# Patient Record
Sex: FEMALE | NOT HISPANIC OR LATINO | Employment: OTHER | URBAN - METROPOLITAN AREA
[De-identification: names, ages, dates, MRNs, and addresses within clinical notes are randomized per-mention and may not be internally consistent; named-entity substitution may affect disease eponyms.]

---

## 2020-06-09 PROCEDURE — 88360 TUMOR IMMUNOHISTOCHEM/MANUAL: CPT | Performed by: RADIOLOGY

## 2020-06-09 PROCEDURE — 88305 TISSUE EXAM BY PATHOLOGIST: CPT | Performed by: RADIOLOGY

## 2020-06-10 ENCOUNTER — LAB REQUISITION (OUTPATIENT)
Dept: LAB | Facility: HOSPITAL | Age: 80
End: 2020-06-10

## 2020-06-10 DIAGNOSIS — Z00.00 ENCOUNTER FOR GENERAL ADULT MEDICAL EXAMINATION WITHOUT ABNORMAL FINDINGS: ICD-10-CM

## 2020-06-18 LAB
LAB AP CASE REPORT: NORMAL
LAB AP DIAGNOSIS COMMENT: NORMAL
LAB AP IHC ER/PR REPORT,ADDENDUM: NORMAL
LAB AP IHC HER2/NEU REPORT,ADDENDUM: NORMAL
PATH REPORT.FINAL DX SPEC: NORMAL
PATH REPORT.GROSS SPEC: NORMAL

## 2021-05-06 ENCOUNTER — TRANSCRIBE ORDERS (OUTPATIENT)
Dept: PHYSICAL THERAPY | Facility: CLINIC | Age: 81
End: 2021-05-06

## 2021-05-06 DIAGNOSIS — M16.0 PRIMARY OSTEOARTHRITIS OF BOTH HIPS: ICD-10-CM

## 2021-05-06 DIAGNOSIS — M70.62 TROCHANTERIC BURSITIS OF LEFT HIP: Primary | ICD-10-CM

## 2021-05-17 ENCOUNTER — TREATMENT (OUTPATIENT)
Dept: PHYSICAL THERAPY | Facility: CLINIC | Age: 81
End: 2021-05-17

## 2021-05-17 DIAGNOSIS — R29.898 WEAKNESS OF RIGHT LEG: ICD-10-CM

## 2021-05-17 DIAGNOSIS — M25.559 PAIN, HIP: ICD-10-CM

## 2021-05-17 DIAGNOSIS — M70.62 TROCHANTERIC BURSITIS OF LEFT HIP: Primary | ICD-10-CM

## 2021-05-17 PROCEDURE — 97110 THERAPEUTIC EXERCISES: CPT | Performed by: PHYSICAL THERAPIST

## 2021-05-17 PROCEDURE — 97161 PT EVAL LOW COMPLEX 20 MIN: CPT | Performed by: PHYSICAL THERAPIST

## 2021-05-17 PROCEDURE — G0283 ELEC STIM OTHER THAN WOUND: HCPCS | Performed by: PHYSICAL THERAPIST

## 2021-05-17 PROCEDURE — 97140 MANUAL THERAPY 1/> REGIONS: CPT | Performed by: PHYSICAL THERAPIST

## 2021-05-17 NOTE — PROGRESS NOTES
Physical Therapy Initial Evaluation and Plan of Care    Patient: Kathy Sidhu   : 1940  Diagnosis/ICD-10 Code:  Trochanteric bursitis of left hip [M70.62]  Referring practitioner: Wilberto Bradford, *  Date of Initial Visit: 2021  Today's Date: 2021  Patient seen for 1 sessions           Subjective Questionnaire: Oxford Hip score       Subjective Evaluation    History of Present Illness  Mechanism of injury: Patient reports that she has had L hip pain since [place] time.  She was seen by ortho - received and injection- pain free until 6 weeks ago.  The pain resurfaced; she did go back to ortho and received a 2nd injection approx 2 - 3 weeks ago.  Only minimal relief at that time. Has noticed that she does shuffle her feet and L lateral  Notices that she must think about stepping up with the R vs L due to the feeling of weakness. She is also having to pull on the handrails.  Has 2-step entry.  LOCATION  Lateral L hip.  DESCRIPTION  Dull ache, feels weak   INCREASES  In/out of car, up steps, sit to stand  DECREASES  Wait a bit before moving.    1ST AM:  Tender and sometimes unpredicible  TIME OF DAY: not consistent with time of day   SLEEP normal is BSL.  Has difficulty getting to sleep   Min change with movement.    TREATMENT:  Ice; you not a diligent  PAST MEDICAL HISTORY:   Breast cancer with chem radiation last year.       Quality of life: good    Pain  Current pain ratin  At best pain ratin  At worst pain ratin    Social Support  Lives with: alone    Treatments  Previous treatment: injection treatment           Objective          Palpation   Left   Tenderness of the gluteus medius, iliopsoas and TFL.     Tenderness     Right Hip   Tenderness in the greater trochanter.     Active Range of Motion   Left Hip   Flexion: 101 degrees   Extension: 5 degrees   Abduction: 25 degrees   External rotation (90/90): 20 degrees     Right Hip   Flexion: with pain  Abduction: with  pain    Joint Play     Right Hip     Hypomobile in the posterior hip capsule, anterior hip capsule, lateral hip capsule and long axis distraction    Strength/Myotome Testing     Left Hip   Planes of Motion   Flexion: 3+  Extension: 4-  Abduction: 3+  Adduction: 3+  External rotation: 3+  Internal rotation: 3+    Right Hip   Planes of Motion   Flexion: 3+    Tests     Left Hip   Positive scour.     Left Hip Flexibility Comments:   Severe limitations:  Hamstrings, piriformis, and hip ER  Mod restrictions of gastroc, psoas, ITB    Ambulation     Observational Gait   Decreased walking speed, right stance time, right swing time and right step length.     Additional Observational Gait Details  Reduced Left extension and mild left lateral lean          Assessment & Plan     Assessment  Impairments: abnormal gait, abnormal or restricted ROM, activity intolerance, impaired balance, lacks appropriate home exercise program and pain with function  Assessment details: Ms. Sidhu is a 80 yr old female referred to PT due to L hip pain.  She was seen in the clinic today for PT evaluation and treatment.  The evidence is consistent with L hip bursitis with associated weakness and reduced ROM.  She would benefit from OPPT in order to achieve the stated goals and resume prior activities.     Eval complexity:  Moderate due to # areas assess, PMHX, decision making and evolving.   Prognosis: good  Functional Limitations: walking, uncomfortable because of pain and sitting  Plan  Therapy options: will be seen for skilled physical therapy services  Planned modality interventions: cryotherapy, dry needling, high voltage pulsed current (pain management), thermotherapy (hydrocollator packs) and ultrasound  Planned therapy interventions: manual therapy, neuromuscular re-education, soft tissue mobilization, strengthening, stretching, therapeutic activities, joint mobilization, home exercise program, gait training, functional ROM exercises,  flexibility, body mechanics training and balance/weight-bearing training  Frequency: 2x week  Duration in visits: 20  Treatment plan discussed with: patient  Plan details: Patient goals:  No pain   STGs:  3 weeks     1)  Pain levels 0-4/10     2)  Pain free and WNLs L hip motion     3)  Patient to report 25% reduction in pain during sit <> stand transitions     4)  Patient to demonstrate correct HEP for hip motion and flexibility ex    LTG:   DC     1)  No hip pain     2)  Normal gait on level and unlevel surfaces     3)  MMT =/> 4/5 L hip     4)  Improve [place] hip =/> 10 points     5)  IHEP        Timed:         Manual Therapy:    9    mins  57333;     Therapeutic Exercise:    11     mins  00657;     Neuromuscular Dodie:        mins  77510;    Therapeutic Activity:          mins  16641;     Gait Training:           mins  01569;     Ultrasound:          mins  60959;    Ionto                                   mins   67258  Self Care                       5     mins   03729  Canalith Repos         mins 86336      Un-Timed:  Electrical Stimulation:    15     mins  38281 (MC );  Dry Needling          mins self-pay  Traction          mins 18492  Low Eval     24     Mins  62174  Mod Eval          Mins  03069  High Eval                            Mins  35548  Re-Eval                               mins  44366        Timed Treatment:   24   mins   Total Treatment:     63   mins    PT SIGNATURE: Martina Ladd PT   DATE TREATMENT INITIATED: 5/17/2021    Initial Certification  Certification Period: 8/15/2021  I certify that the therapy services are furnished while this patient is under my care.  The services outlined above are required by this patient, and will be reviewed every 90 days.     PHYSICIAN: Wilberto Bradford MD      DATE:     Please sign and return via fax to [phone].. Thank you, [facility].

## 2021-05-21 ENCOUNTER — TREATMENT (OUTPATIENT)
Dept: PHYSICAL THERAPY | Facility: CLINIC | Age: 81
End: 2021-05-21

## 2021-05-21 DIAGNOSIS — M70.62 TROCHANTERIC BURSITIS OF LEFT HIP: Primary | ICD-10-CM

## 2021-05-21 DIAGNOSIS — M25.559 PAIN, HIP: ICD-10-CM

## 2021-05-21 DIAGNOSIS — R29.898 WEAKNESS OF RIGHT LEG: ICD-10-CM

## 2021-05-21 PROCEDURE — 97140 MANUAL THERAPY 1/> REGIONS: CPT | Performed by: PHYSICAL THERAPIST

## 2021-05-21 PROCEDURE — 97110 THERAPEUTIC EXERCISES: CPT | Performed by: PHYSICAL THERAPIST

## 2021-05-21 NOTE — PROGRESS NOTES
Physical Therapy Daily Progress Note    VISIT#: 2    Subjective   Kathy Sidhu reports she drove down to [place] yesterday to visit some friends. She had some cream that helps with inflammation and she put that on her hip and she tolerated the drive without any discomfort. States the only exercise she is having trouble with is the seated exercise where she has to cross her leg.   Current Pain Level: 1-2/10    Objective   TTP over the L greater trochanter    See Exercise, Manual, and Modality Logs for complete treatment.     Patient Education: changed hip ER stretch from seated to HL. Added hip IR stretch and hip flexor stretch to her HEP. Handouts given.    Assessment/Plan  Patient is seeing some improvement with her hip symptoms since starting treatment. Her L hip ROM is still limited however she was able to tolerate the HL hip ER stretch better than in the seated position.    Progress per Plan of Care    Goals  STGs:  3 weeks     1)  Pain levels 0-4/10     2)  Pain free and WNLs L hip motion     3)  Patient to report 25% reduction in pain during sit <> stand transitions     4)  Patient to demonstrate correct HEP for hip motion and flexibility ex    LTG:   DC     1)  No hip pain     2)  Normal gait on level and unlevel surfaces     3)  MMT =/> 4/5 L hip     4)  Improve [place] hip =/> 10 points     5)  IHEP          Timed:         Manual Therapy:    13     mins  96631;     Therapeutic Exercise:    35     mins  42819;       Un-Timed:  Electrical Stimulation:    15     mins  68739 ( );      Timed Treatment:   48   mins   Total Treatment:     63   mins      Carina Barger PTA    Physical Therapist Assistant

## 2021-05-24 ENCOUNTER — TREATMENT (OUTPATIENT)
Dept: PHYSICAL THERAPY | Facility: CLINIC | Age: 81
End: 2021-05-24

## 2021-05-24 DIAGNOSIS — M70.62 TROCHANTERIC BURSITIS OF LEFT HIP: Primary | ICD-10-CM

## 2021-05-24 DIAGNOSIS — R29.898 WEAKNESS OF RIGHT LEG: ICD-10-CM

## 2021-05-24 DIAGNOSIS — M25.559 PAIN, HIP: ICD-10-CM

## 2021-05-24 PROCEDURE — 97140 MANUAL THERAPY 1/> REGIONS: CPT | Performed by: PHYSICAL THERAPIST

## 2021-05-24 PROCEDURE — G0283 ELEC STIM OTHER THAN WOUND: HCPCS | Performed by: PHYSICAL THERAPIST

## 2021-05-24 PROCEDURE — 97110 THERAPEUTIC EXERCISES: CPT | Performed by: PHYSICAL THERAPIST

## 2021-05-24 NOTE — PROGRESS NOTES
Physical Therapy Daily Progress Note    VISIT#: 3    Subjective   Kathy Sidhu reports her hip feels a little better today but it is still bothering her at times.   Current Pain Level: 3/10    Objective     See Exercise, Manual, and Modality Logs for complete treatment.     Patient Education: cueing for form; added SLR, hip abd and clamshells to her HEP. Printouts given.    Assessment/Plan  Patient tolerated progressed LE strengthening exercises well this date. She remains tender to palpation over the L greater trochanter. Better tolerance to her stretches but the fig 4 stretch can still be challenging to get into position. Once in the correct position the stretches are fine.    Progress per Plan of Care     Goals  STGs:  3 weeks     1)  Pain levels 0-4/10     2)  Pain free and WNLs L hip motion     3)  Patient to report 25% reduction in pain during sit <> stand transitions     4)  Patient to demonstrate correct HEP for hip motion and flexibility ex    LTG:   DC     1)  No hip pain     2)  Normal gait on level and unlevel surfaces     3)  MMT =/> 4/5 L hip     4)  Improve [place] hip =/> 10 points     5)  IHEP           Timed:         Manual Therapy:    18     mins  42450;     Therapeutic Exercise:    33     mins  93153;         Un-Timed:  Electrical Stimulation:    15     mins  99191 ( );  NuStep: 7 min (no charge)    Timed Treatment:   51   mins   Total Treatment:     73   mins      Carina Barger PTA    Physical Therapist Assistant

## 2021-05-28 ENCOUNTER — TREATMENT (OUTPATIENT)
Dept: PHYSICAL THERAPY | Facility: CLINIC | Age: 81
End: 2021-05-28

## 2021-05-28 DIAGNOSIS — M70.62 TROCHANTERIC BURSITIS OF LEFT HIP: Primary | ICD-10-CM

## 2021-05-28 DIAGNOSIS — R29.898 WEAKNESS OF RIGHT LEG: ICD-10-CM

## 2021-05-28 DIAGNOSIS — M25.559 PAIN, HIP: ICD-10-CM

## 2021-05-28 PROCEDURE — 97110 THERAPEUTIC EXERCISES: CPT | Performed by: PHYSICAL THERAPIST

## 2021-05-28 PROCEDURE — G0283 ELEC STIM OTHER THAN WOUND: HCPCS | Performed by: PHYSICAL THERAPIST

## 2021-05-28 PROCEDURE — 97530 THERAPEUTIC ACTIVITIES: CPT | Performed by: PHYSICAL THERAPIST

## 2021-05-28 NOTE — PROGRESS NOTES
Physical Therapy Daily Progress Note    VISIT#: 4    Subjective   Kathy Sidhu reports her hip has felt a lot better since her last visit. She isn't sure if it was the treatment or the icyhot patch she put on last night but it is better.  Current Pain Level: 0/10    Objective     See Exercise, Manual, and Modality Logs for complete treatment.     Patient Education: added standing hip flex, abd, ext and seated hip ER to her HEP. Handouts given.    Assessment/Plan  Patient is seeing an improvement in her hip symptoms. Hip ER ROM is still limited but showing slow improvement. Remains very tender to palpation with the XFM but that treatment does seem to help. Pt has been consistent with her HEP.     Progress per Plan of Care     Goals  STGs:  3 weeks     1)  Pain levels 0-4/10     2)  Pain free and WNLs L hip motion     3)  Patient to report 25% reduction in pain during sit <> stand transitions     4)  Patient to demonstrate correct HEP for hip motion and flexibility ex    LTG:   DC     1)  No hip pain     2)  Normal gait on level and unlevel surfaces     3)  MMT =/> 4/5 L hip     4)  Improve [place] hip =/> 10 points     5)  IHEP          Timed:         Manual Therapy:    3     mins  77035;     Therapeutic Exercise:    30     mins  59776;      Therapeutic Activity:     12     mins  68129;         Un-Timed:  Bike: 10 min (no charge)  Electrical Stimulation: _15__ mins 00559 ( );      Timed Treatment:   45   mins   Total Treatment:     70   mins      Carina Barger PTA    Physical Therapist Assistant

## 2021-06-01 ENCOUNTER — TREATMENT (OUTPATIENT)
Dept: PHYSICAL THERAPY | Facility: CLINIC | Age: 81
End: 2021-06-01

## 2021-06-01 DIAGNOSIS — M70.62 TROCHANTERIC BURSITIS OF LEFT HIP: Primary | ICD-10-CM

## 2021-06-01 DIAGNOSIS — R29.898 WEAKNESS OF RIGHT LEG: ICD-10-CM

## 2021-06-01 DIAGNOSIS — M25.559 PAIN, HIP: ICD-10-CM

## 2021-06-01 PROCEDURE — 97110 THERAPEUTIC EXERCISES: CPT | Performed by: PHYSICAL THERAPIST

## 2021-06-01 PROCEDURE — G0283 ELEC STIM OTHER THAN WOUND: HCPCS | Performed by: PHYSICAL THERAPIST

## 2021-06-01 PROCEDURE — 97140 MANUAL THERAPY 1/> REGIONS: CPT | Performed by: PHYSICAL THERAPIST

## 2021-06-01 NOTE — PROGRESS NOTES
Physical Therapy Daily Progress Note    VISIT#: 5    Subjective   Kathy Sidhu reports her L hip started really bothering her on Sunday but wasn't doing anything extra or out of the ordinary.  She is also noticing her L Achilles tendon has been very sore and isn't sure why.  Current Pain Level: 5/10    Objective     See Exercise, Manual, and Modality Logs for complete treatment.     Patient Education: she was using a thera band for her stretches at home and believed to be over stretching her ankle due to pulling extra hard with the stretchy band. Recommended using a non-stretchy strap for her stretch.    Assessment/Plan  Kathy presented with increased pain and discomfort in the L hip today with stretches and exercises. Limited tolerance to treatment Did not recreate her heel pain while in the clinic this date and tolerated it better with the stretch out strap. Held standing activities due to the increased pain. ESTIM/ice used at end of session for pain management.    Progress per Plan of Care     Goals  STGs:  3 weeks     1)  Pain levels 0-4/10     2)  Pain free and WNLs L hip motion     3)  Patient to report 25% reduction in pain during sit <> stand transitions     4)  Patient to demonstrate correct HEP for hip motion and flexibility ex    LTG:   DC     1)  No hip pain     2)  Normal gait on level and unlevel surfaces     3)  MMT =/> 4/5 L hip     4)  Improve [place] hip =/> 10 points     5)  IHEP          Timed:         Manual Therapy:    17     mins  99321;     Therapeutic Exercise:    38     mins  70770;        Un-Timed:  Electrical Stimulation:    15     mins  77757 ( );      Timed Treatment:   55   mins   Total Treatment:     70   mins      Carina Barger PTA    Physical Therapist Assistant

## 2021-06-03 ENCOUNTER — TREATMENT (OUTPATIENT)
Dept: PHYSICAL THERAPY | Facility: CLINIC | Age: 81
End: 2021-06-03

## 2021-06-03 DIAGNOSIS — M70.62 TROCHANTERIC BURSITIS OF LEFT HIP: Primary | ICD-10-CM

## 2021-06-03 DIAGNOSIS — M25.559 PAIN, HIP: ICD-10-CM

## 2021-06-03 PROCEDURE — 97110 THERAPEUTIC EXERCISES: CPT | Performed by: PHYSICAL THERAPIST

## 2021-06-03 PROCEDURE — G0283 ELEC STIM OTHER THAN WOUND: HCPCS | Performed by: PHYSICAL THERAPIST

## 2021-06-03 PROCEDURE — 97140 MANUAL THERAPY 1/> REGIONS: CPT | Performed by: PHYSICAL THERAPIST

## 2021-06-03 NOTE — PROGRESS NOTES
Physical Therapy Daily Progress Note    VISIT#: 6    Subjective   Kathy Sidhu reports that she is doing well with decreased pain in her hip at this time.   Pain Rating (0/10): 3    Objective     See Exercise, Manual, and Modality Logs for complete treatment.     Assessment/Plan   Pt continues to progress LLE strength and standing endurance with minimal increase in pain. Pt continues to have decreased mobility in L hip at this time.     Plan  Progress per Plan of Care and Progress strengthening /stabilization /functional activity            Timed:         Manual Therapy:    15     mins  78126;     Therapeutic Exercise:    15     mins  38758;     Neuromuscular Dodie:        mins  75318;    Therapeutic Activity:          mins  16937;     Gait Training:           mins  64360;     Ultrasound:         mins  26719;    Ionto                                   mins   95186  Self Care                            mins   90129    Un-Timed:  Electrical Stimulation:    15     mins  34678 ( );  Dry Needling          mins self-pay  Traction          mins 36588  Low Eval          Mins  43870  Mod Eval          Mins  07053  High Eval                            Mins  87771  Re-Eval                               mins  56255    Timed Treatment:   30   mins   Total Treatment:     45   mins    Jane Sharma PT, DPT  Physical Therapist

## 2021-06-08 ENCOUNTER — TREATMENT (OUTPATIENT)
Dept: PHYSICAL THERAPY | Facility: CLINIC | Age: 81
End: 2021-06-08

## 2021-06-08 DIAGNOSIS — M25.559 PAIN, HIP: ICD-10-CM

## 2021-06-08 DIAGNOSIS — R29.898 WEAKNESS OF RIGHT LEG: ICD-10-CM

## 2021-06-08 DIAGNOSIS — M70.62 TROCHANTERIC BURSITIS OF LEFT HIP: Primary | ICD-10-CM

## 2021-06-08 PROCEDURE — 97140 MANUAL THERAPY 1/> REGIONS: CPT | Performed by: PHYSICAL THERAPIST

## 2021-06-08 PROCEDURE — 97110 THERAPEUTIC EXERCISES: CPT | Performed by: PHYSICAL THERAPIST

## 2021-06-08 PROCEDURE — 97530 THERAPEUTIC ACTIVITIES: CPT | Performed by: PHYSICAL THERAPIST

## 2021-06-08 NOTE — PROGRESS NOTES
Physical Therapy Daily Progress Note    VISIT#: 7    Subjective   Kathy Sidhu reports her hip has done very well over the weekend and through today. She has been consistent with her HEP and seems to really be helping.  Current Pain Level: 0/10     Objective     See Exercise, Manual, and Modality Logs for complete treatment.     Patient Education: added some TB to her standing 4 way hip and added small cones to her side steps.    Assessment/Plan  Patient is seeing an improvement with her pain level overall. The only thing still limiting her is the weakness still present in the LLE. Her balance is good and did not require UE support for the cone step overs. Added some TB resistance to standing 4 way hip and cones to her side steps and performed well with only mild fatigue following.     Progress per Plan of Care     Goals  STGs:  3 weeks     1)  Pain levels 0-4/10     2)  Pain free and WNLs L hip motion     3)  Patient to report 25% reduction in pain during sit <> stand transitions     4)  Patient to demonstrate correct HEP for hip motion and flexibility ex    LTG:   DC     1)  No hip pain     2)  Normal gait on level and unlevel surfaces     3)  MMT =/> 4/5 L hip     4)  Improve [place] hip =/> 10 points     5)  IHEP          Timed:         Manual Therapy:    10     mins  43472;     Therapeutic Exercise:    15     mins  99940;      Therapeutic Activity:     13     mins  67192;         Un-Timed:  NuStep: 7 min (no charge)      Timed Treatment:   38   mins   Total Treatment:     45   mins      Carina Barger PTA    Physical Therapist Assistant

## 2021-06-10 ENCOUNTER — TREATMENT (OUTPATIENT)
Dept: PHYSICAL THERAPY | Facility: CLINIC | Age: 81
End: 2021-06-10

## 2021-06-10 DIAGNOSIS — M25.559 PAIN, HIP: ICD-10-CM

## 2021-06-10 DIAGNOSIS — R29.898 WEAKNESS OF RIGHT LEG: ICD-10-CM

## 2021-06-10 DIAGNOSIS — M70.62 TROCHANTERIC BURSITIS OF LEFT HIP: Primary | ICD-10-CM

## 2021-06-10 PROCEDURE — 97530 THERAPEUTIC ACTIVITIES: CPT | Performed by: PHYSICAL THERAPIST

## 2021-06-10 PROCEDURE — 97110 THERAPEUTIC EXERCISES: CPT | Performed by: PHYSICAL THERAPIST

## 2021-06-10 PROCEDURE — 97140 MANUAL THERAPY 1/> REGIONS: CPT | Performed by: PHYSICAL THERAPIST

## 2021-06-10 NOTE — PROGRESS NOTES
Physical Therapy Daily Progress Note    VISIT#: 8    Subjective   Kathy Sidhu reports she was a little sore the following day from the new TB exercises however today she is back to not having any pain in the hip just some stiffness.  Current Pain Level: 0/10     Objective     See Exercise, Manual, and Modality Logs for complete treatment.     Patient Education: switched HS stretch to seated vs supine due to groin discomfort. Added some mini squats to her HEP.    Assessment/Plan  Patient did get sore following the progression of exercises last visit however she recovered quickly and her tolerance to activity is improved. She still presents with some stiffness and limited hip ROM but continues to work on her flexibility in the clinic and at home.     Progress per Plan of Care     Goals  STGs:  3 weeks     1)  Pain levels 0-4/10     2)  Pain free and WNLs L hip motion     3)  Patient to report 25% reduction in pain during sit <> stand transitions     4)  Patient to demonstrate correct HEP for hip motion and flexibility ex    LTG:   DC     1)  No hip pain     2)  Normal gait on level and unlevel surfaces     3)  MMT =/> 4/5 L hip     4)  Improve [place] hip =/> 10 points     5)  IHEP             Timed:         Manual Therapy:    14     mins  23010;     Therapeutic Exercise:    25     mins  07288;      Therapeutic Activity:     16     mins  57953;       Un-Timed:    Timed Treatment:   55   mins   Total Treatment:     55   mins      Carina Barger PTA    Physical Therapist Assistant

## 2021-06-15 ENCOUNTER — TREATMENT (OUTPATIENT)
Dept: PHYSICAL THERAPY | Facility: CLINIC | Age: 81
End: 2021-06-15

## 2021-06-15 DIAGNOSIS — R29.898 WEAKNESS OF RIGHT LEG: ICD-10-CM

## 2021-06-15 DIAGNOSIS — M70.62 TROCHANTERIC BURSITIS OF LEFT HIP: Primary | ICD-10-CM

## 2021-06-15 DIAGNOSIS — M25.559 PAIN, HIP: ICD-10-CM

## 2021-06-15 PROCEDURE — 97530 THERAPEUTIC ACTIVITIES: CPT | Performed by: PHYSICAL THERAPIST

## 2021-06-15 PROCEDURE — 97110 THERAPEUTIC EXERCISES: CPT | Performed by: PHYSICAL THERAPIST

## 2021-06-15 PROCEDURE — 97140 MANUAL THERAPY 1/> REGIONS: CPT | Performed by: PHYSICAL THERAPIST

## 2021-06-15 NOTE — PROGRESS NOTES
Physical Therapy Daily Progress Note    VISIT#: 9    Subjective   Kathy Sidhu reports no soreness following her last session.   Current Pain Level: 1/10    Objective   Decreased tenderness to plapation over the L greater trochanter.    See Exercise, Manual, and Modality Logs for complete treatment.     Assessment/Plan  Patient's pain and strength in the L hip/LE is improving. Her ROM in the hip is still tight and restricted but is making gradual progress. She is consistent with her HEP.    Next Visit: add weight to SLR's  Progress per Plan of Care     Goals  STGs:  3 weeks     1)  Pain levels 0-4/10     2)  Pain free and WNLs L hip motion     3)  Patient to report 25% reduction in pain during sit <> stand transitions     4)  Patient to demonstrate correct HEP for hip motion and flexibility ex    LTG:   DC     1)  No hip pain     2)  Normal gait on level and unlevel surfaces     3)  MMT =/> 4/5 L hip     4)  Improve [place] hip =/> 10 points     5)  IHEP          Timed:         Manual Therapy:    14     mins  48407;     Therapeutic Exercise:    17     mins  18650;      Therapeutic Activity:     13     mins  82927;       Un-Timed:  NuStep: 7 min (no charge)      Timed Treatment:   44   mins   Total Treatment:     51   mins      Carina Barger PTA    Physical Therapist Assistant

## 2021-06-24 ENCOUNTER — TREATMENT (OUTPATIENT)
Dept: PHYSICAL THERAPY | Facility: CLINIC | Age: 81
End: 2021-06-24

## 2021-06-24 DIAGNOSIS — M70.62 TROCHANTERIC BURSITIS OF LEFT HIP: Primary | ICD-10-CM

## 2021-06-24 DIAGNOSIS — R29.898 WEAKNESS OF RIGHT LEG: ICD-10-CM

## 2021-06-24 DIAGNOSIS — M25.559 PAIN, HIP: ICD-10-CM

## 2021-06-24 PROCEDURE — 97140 MANUAL THERAPY 1/> REGIONS: CPT | Performed by: PHYSICAL THERAPIST

## 2021-06-24 PROCEDURE — 97530 THERAPEUTIC ACTIVITIES: CPT | Performed by: PHYSICAL THERAPIST

## 2021-06-24 PROCEDURE — G0283 ELEC STIM OTHER THAN WOUND: HCPCS | Performed by: PHYSICAL THERAPIST

## 2021-06-24 NOTE — PROGRESS NOTES
"Physical Therapy 30-Day  Progress Note    VISIT#: 10    Subjective   Kathy Sidhu reports that she had her port removed this past week; some soreness in the chest from the procedure.  L hip pain is \"doing better\"   0-3/10.  Most of the time the pain is 2/10       Objective   ROM:   L hip flex 112,  abd 40,  Er 29  MMT:  Flex 3+, abd 3+, ER 3+ all with mild pain  Gait:  wo AD,  Initial steps from sitting with moderate L ipsilateral trunk lean, mild reduced LLE stance time, early heel off.  Sit <> stand:  wo use of UEs; but slow and must stand for a few seconds before taking stpes.   Palpation:  Point tenderness with thickening distal to and on the L greater trochanter  See Exercise, Manual, and Modality Logs for complete treatment.     Patient Education:  Cont with the current home ex.  Can change the piriformis and hip ER stretches to sitting vs supine to ease stress on UE.    Assessment/Plan  Today is visit 10, including the eval date.   Kathy has achieved the STGs and progressing with the LTGs.   She would benefit from several more sessions in order to reach max functional mobility and to see if we can reduce more L lateral hip pain     STGs:  3 weeks all met 6/24/21      1)  Pain levels 0-4/10     2)  Pain free and WNLs L hip motion     3)  Patient to report 25% reduction in pain during sit <> stand transitions     4)  Patient to demonstrate correct HEP for hip motion and flexibility ex    LTG:   DC progressing 6/24/21      1)  No hip pain     2)  Normal gait on level and unlevel surfaces     3)  MMT =/> 4/5 L hip     4)  Improve [place] hip =/> 10 points     5)  IHEP    PLAN: cont with the current treatment plan x's 2 more weeks then reassess status for possible DC    Check leg lengths.           Timed:         Manual Therapy:    11     mins  32219;     Therapeutic Exercise:    10     mins  23562;     Neuromuscular Dodie:        mins  19791;    Therapeutic Activity:     13     mins  92615;     Gait Training:         "   mins  07308;     Ultrasound:          mins  59478;    Ionto                                   mins   83251  Self Care                            mins   66491  Canalith Repos                   mins  98533    Un-Timed:  Electrical Stimulation:    15     mins  94598 ( );  Dry Needling          mins self-pay  Traction          mins 11818  Low Eval          Mins  49423  Mod Eval          Mins  18345  High Eval                            Mins  19606  Re-Eval                               mins  43269    Timed Treatment:   34   mins   Total Treatment:     49   mins    Martina Ladd PT    Physical Therapist

## 2021-06-29 ENCOUNTER — TREATMENT (OUTPATIENT)
Dept: PHYSICAL THERAPY | Facility: CLINIC | Age: 81
End: 2021-06-29

## 2021-06-29 DIAGNOSIS — M25.559 PAIN, HIP: Primary | ICD-10-CM

## 2021-06-29 PROCEDURE — G0283 ELEC STIM OTHER THAN WOUND: HCPCS | Performed by: PHYSICAL THERAPIST

## 2021-06-29 PROCEDURE — 97530 THERAPEUTIC ACTIVITIES: CPT | Performed by: PHYSICAL THERAPIST

## 2021-06-29 PROCEDURE — 97140 MANUAL THERAPY 1/> REGIONS: CPT | Performed by: PHYSICAL THERAPIST

## 2021-06-29 NOTE — PROGRESS NOTES
Physical Therapy Daily Progress Note    VISIT#: 11    Subjective   Kathy Sidhu reports no change in her pain      Objective   Inspection as prior session.   Slight improved reduced lateral trunk lean upon wt bearing  See Exercise, Manual, and Modality Logs for complete treatment.     Patient Education:  Cont with current HEP    Assessment/Plan   Kathy able to tolerate the current ex w limited pain.      STGs:  3 weeks all met 6/24/21      1)  Pain levels 0-4/10     2)  Pain free and WNLs L hip motion     3)  Patient to report 25% reduction in pain during sit <> stand transitions     4)  Patient to demonstrate correct HEP for hip motion and flexibility ex    LTG:   DC progressing 6/24/21      1)  No hip pain     2)  Normal gait on level and unlevel surfaces     3)  MMT =/> 4/5 L hip     4)  Improve [place] hip =/> 10 points     5)  IHEP    Plan:  Cont another 3-4 visits            Timed:         Manual Therapy:    11     mins  09770;     Therapeutic Exercise:    10     mins  42105;     Neuromuscular Dodie:        mins  75523;    Therapeutic Activity:     13     mins  32083;     Gait Training:           mins  71503;     Ultrasound:          mins  27462;    Ionto                                   mins   80908  Self Care                            mins   93367  Canalith Repos                   mins  89311    Un-Timed:  Electrical Stimulation:    15     mins  13677 ( );  Dry Needling          mins self-pay  Traction          mins 50397  Low Eval          Mins  42209  Mod Eval          Mins  35607  High Eval                            Mins  46520  Re-Eval                               mins  55148    Timed Treatment:   34   mins   Total Treatment:     49   mins    Martina Ladd PT    Physical Therapist

## 2021-07-01 ENCOUNTER — TREATMENT (OUTPATIENT)
Dept: PHYSICAL THERAPY | Facility: CLINIC | Age: 81
End: 2021-07-01

## 2021-07-01 DIAGNOSIS — M70.62 TROCHANTERIC BURSITIS OF LEFT HIP: ICD-10-CM

## 2021-07-01 DIAGNOSIS — R29.898 WEAKNESS OF RIGHT LEG: ICD-10-CM

## 2021-07-01 DIAGNOSIS — M25.559 PAIN, HIP: Primary | ICD-10-CM

## 2021-07-01 PROCEDURE — 97140 MANUAL THERAPY 1/> REGIONS: CPT | Performed by: PHYSICAL THERAPIST

## 2021-07-01 PROCEDURE — G0283 ELEC STIM OTHER THAN WOUND: HCPCS | Performed by: PHYSICAL THERAPIST

## 2021-07-01 PROCEDURE — 97110 THERAPEUTIC EXERCISES: CPT | Performed by: PHYSICAL THERAPIST

## 2021-07-01 NOTE — PROGRESS NOTES
Physical Therapy Daily Progress Note    VISIT#: 12    Subjective   Kathy Sidhu reports that the left leg feels weak.   0- 3/10.   Most of the time the pain is 1/10.  Worse is initial movement.from sitting  And getting out of car. Crossing left leg over the right to get her shoes/socks  on and wash L foot.       Objective   Gait with ipsilateral L trunk lean with LLE wt bearing during gait and SLS.  Very min stretch of L ITB during stance.  Quad stretch more effective in prone.    See Exercise, Manual, and Modality Logs for complete treatment.     Patient Education: hold on the psoas, ITB stretches in standing if no stretch is felt    Assessment/Plan   Added prone quad and psoas stretch today.   Unable to achieve any stretch of psoas or ITB in standing. Gait at end of session with increased L hip extension, less L trunk lean.  L hip abd remains weak and painful.    STGs:  3 weeks all met 6/24/21      1)  Pain levels 0-4/10     2)  Pain free and WNLs L hip motion     3)  Patient to report 25% reduction in pain during sit <> stand transitions     4)  Patient to demonstrate correct HEP for hip motion and flexibility ex    LTG:   DC progressing 6/24/21      1)  No hip pain     2)  Normal gait on level and unlevel surfaces     3)  MMT =/> 4/5 L hip     4)  Improve [place] hip =/> 10 points     5)  IHEP              Timed:         Manual Therapy:    14     mins  14844;     Therapeutic Exercise:    11     mins  75012;     Neuromuscular Dodie:    8    mins  91437;    Therapeutic Activity:          mins  66032;     Gait Training:           mins  04759;     Ultrasound:          mins  69229;    Ionto                                   mins   66757  Self Care                            mins   55395  Canalith Repos                   mins  19188    Un-Timed:  Electrical Stimulation:    15     mins  57507 ( );  Dry Needling          mins self-pay  Traction          mins 96794  Low Eval          Mins  20272  Mod Eval           Mins  57958  High Eval                            Mins  00793  Re-Eval                               mins  76310    Timed Treatment:   33   mins   Total Treatment:     48   mins    Martina Ladd PT    Physical Therapist

## 2021-07-06 ENCOUNTER — TREATMENT (OUTPATIENT)
Dept: PHYSICAL THERAPY | Facility: CLINIC | Age: 81
End: 2021-07-06

## 2021-07-06 DIAGNOSIS — M70.62 TROCHANTERIC BURSITIS OF LEFT HIP: ICD-10-CM

## 2021-07-06 DIAGNOSIS — R29.898 WEAKNESS OF RIGHT LEG: ICD-10-CM

## 2021-07-06 DIAGNOSIS — M25.559 PAIN, HIP: Primary | ICD-10-CM

## 2021-07-06 PROCEDURE — 97110 THERAPEUTIC EXERCISES: CPT | Performed by: PHYSICAL THERAPIST

## 2021-07-06 PROCEDURE — G0283 ELEC STIM OTHER THAN WOUND: HCPCS | Performed by: PHYSICAL THERAPIST

## 2021-07-06 PROCEDURE — 97530 THERAPEUTIC ACTIVITIES: CPT | Performed by: PHYSICAL THERAPIST

## 2021-07-06 PROCEDURE — 97140 MANUAL THERAPY 1/> REGIONS: CPT | Performed by: PHYSICAL THERAPIST

## 2021-07-06 NOTE — PROGRESS NOTES
Physical Therapy Daily Progress Note    VISIT#: 13    Subjective   Kathy Sidhu reports she still has to stop before taking her first few steps when coming from sitting due to pain and stiffness.  Current Pain Level: 1/10    Objective     See Exercise, Manual, and Modality Logs for complete treatment.     Patient Education: increased standing theraband exercises to red. Instructed to decrease band back to yellow if increases her hip pain too much.    Assessment/Plan  Kathy's left hip remains tight but she continues to work on her flexibility. Tenderness still present around the L greater trochanter. Tolerating her exercises very well but continues to feel a strong stretch with the prone quad stretch and seated piriformis.     Progress per Plan of Care    STGs:  3 weeks all met 6/24/21      1)  Pain levels 0-4/10     2)  Pain free and WNLs L hip motion     3)  Patient to report 25% reduction in pain during sit <> stand transitions     4)  Patient to demonstrate correct HEP for hip motion and flexibility ex    LTG:   DC progressing 6/24/21      1)  No hip pain     2)  Normal gait on level and unlevel surfaces     3)  MMT =/> 4/5 L hip     4)  Improve [place] hip =/> 10 points     5)  IHEP          Timed:         Manual Therapy:    14     mins  02524;     Therapeutic Exercise:    14     mins  04112;       Therapeutic Activity:     12     mins  24381;       Un-Timed:  Electrical Stimulation:    15     mins  40228 ( );      Timed Treatment:   40   mins   Total Treatment:     55   mins      Carina Barger PTA    Physical Therapist Assistant

## 2021-07-08 ENCOUNTER — TREATMENT (OUTPATIENT)
Dept: PHYSICAL THERAPY | Facility: CLINIC | Age: 81
End: 2021-07-08

## 2021-07-08 PROCEDURE — 97112 NEUROMUSCULAR REEDUCATION: CPT | Performed by: PHYSICAL THERAPIST

## 2021-07-08 PROCEDURE — 97140 MANUAL THERAPY 1/> REGIONS: CPT | Performed by: PHYSICAL THERAPIST

## 2021-07-08 PROCEDURE — 97110 THERAPEUTIC EXERCISES: CPT | Performed by: PHYSICAL THERAPIST

## 2021-07-08 NOTE — PROGRESS NOTES
Physical Therapy Daily Progress Note    VISIT#: 14    Subjective   Kathy Sidhu reports the left hip pain is 1/10 when getting up.         Objective   L SLS 30 sec wo pain  L hip ext  15 degrees  L hip flex, abd WNL wo pain  MMT  4/5 hip flex/ext,abd wo pain  Gait: with very mild reduction of L hip ext and mild reduction of L stance time.   See Exercise, Manual, and Modality Logs for complete treatment.     Patient Education:roller pin for home distal ITB massag    Assessment/Plan  Kathy has progressed with PT over the last 2 weeks.  She is I in home ex.   Si <> stand and gait are still slightly painful with notes of pain.   She has achieved the MMT LTG and almost achieved the other LTGs.   NO IFC today due to low pain levels.     STGs:  3 weeks all met 6/24/21      1)  Pain levels 0-4/10     2)  Pain free and WNLs L hip motion     3)  Patient to report 25% reduction in pain during sit <> stand transitions     4)  Patient to demonstrate correct HEP for hip motion and flexibility ex    LTG:   DC      1)  No hip pain progressed 7/8/21     2)  Normal gait on level and unlevel surfaces progressed 7/8/21     3)  MMT =/> 4/5 L hip met 7/8/21     4)  Improve [place] hip =/> 10 points     5)  IHEP  Met 7/8/21    PLAN:   Place PT on hold x's 7-10 days.  Phone call follow up; if no change will plan DC at that time.             Timed:         Manual Therapy:    14     mins  10307;     Therapeutic Exercise:    11     mins  61889;     Neuromuscular Dodie:    12    mins  58255;    Therapeutic Activity:          mins  15628;     Gait Training:           mins  96287;     Ultrasound:          mins  89363;    Ionto                                   mins   37891  Self Care                            mins   14011  Canalith Repos                   mins  43243    Un-Timed:  Electrical Stimulation:         mins  91062 ( );  Dry Needling          mins self-pay  Traction          mins 96185  Low Eval          Mins  74463  Mod Eval           Mins  77280  High Eval                            Mins  93077  Re-Eval                               mins  52647    Timed Treatment:   38   mins   Total Treatment:     47   mins    Martina Ladd PT    Physical Therapist